# Patient Record
Sex: MALE | Race: BLACK OR AFRICAN AMERICAN | NOT HISPANIC OR LATINO | Employment: FULL TIME | URBAN - METROPOLITAN AREA
[De-identification: names, ages, dates, MRNs, and addresses within clinical notes are randomized per-mention and may not be internally consistent; named-entity substitution may affect disease eponyms.]

---

## 2022-09-01 ENCOUNTER — HOSPITAL ENCOUNTER (EMERGENCY)
Facility: HOSPITAL | Age: 27
Discharge: HOME | End: 2022-09-01
Admitting: EMERGENCY MEDICINE

## 2022-09-01 VITALS
TEMPERATURE: 96.8 F | HEART RATE: 68 BPM | BODY MASS INDEX: 34.54 KG/M2 | DIASTOLIC BLOOD PRESSURE: 92 MMHG | SYSTOLIC BLOOD PRESSURE: 125 MMHG | RESPIRATION RATE: 18 BRPM | HEIGHT: 69 IN | WEIGHT: 233.2 LBS | OXYGEN SATURATION: 95 %

## 2022-09-01 DIAGNOSIS — M54.9 BACK PAIN: ICD-10-CM
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
## 2022-09-01 PROCEDURE — 99283 EMERGENCY DEPT VISIT LOW MDM: CPT

## 2022-09-01 RX ORDER — CYCLOBENZAPRINE HCL 10 MG
10 TABLET ORAL 3 TIMES DAILY PRN
Qty: 20 TABLET | Refills: 0 | Status: SHIPPED | OUTPATIENT
Start: 2022-09-01 | End: 2022-09-07

## 2022-09-01 NOTE — ED TRIAGE NOTES
Patient was at work yesterday and was sitting in the back seat of truck when lower back suddenly started to hurt. Patient did not realize his back was hurt until he went to lay down and go to sleep. At that point patient had more pain in back when he tries to lay down and when he is sitting. No medication taken for pain.     Triage Assessment     Row Name 09/01/22 0928       Triage Assessment (Adult)    Airway WDL WDL       Respiratory WDL    Respiratory WDL WDL       Skin Circulation/Temperature WDL    Skin Circulation/Temperature WDL WDL       Cardiac WDL    Cardiac WDL WDL       Peripheral/Neurovascular WDL    Peripheral Neurovascular WDL WDL       Cognitive/Neuro/Behavioral WDL    Cognitive/Neuro/Behavioral WDL WDL

## 2022-09-01 NOTE — DISCHARGE INSTRUCTIONS
You are seen here today for evaluation of back pain.  Your exam today is reassuring with no neurologic abnormalities or evidence of fractures/dislocation.    You will likely be sore for several days. You may take Tylenol and ibuprofen for pain/fever, do not exceed 4000 mg of Tylenol per day or 3200 mg ofibuprofen per day. I will prescribe you a muscle relaxant called Flexeril to help with your pain-this medication might make you drowsy so do not drink alcohol, drive, or operate machinery while taking this medicine.    Follow-up with your primary doctor if your symptoms or not improving.  Return here for any new or worsening symptoms like severe pain, difficulty walking, peeing or pooping in your pants, numbness or tingling in your genital area, blood coming out of anywhere it is not supposed to be, or any other symptoms that concern you.

## 2022-09-01 NOTE — Clinical Note
Lawson Jackson was seen and treated in our emergency department on 9/1/2022.  He may return to work on 09/06/2022.       If you have any questions or concerns, please don't hesitate to call.      Kiersten Lawrence PA-C
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
